# Patient Record
Sex: FEMALE | Race: WHITE
[De-identification: names, ages, dates, MRNs, and addresses within clinical notes are randomized per-mention and may not be internally consistent; named-entity substitution may affect disease eponyms.]

---

## 2018-03-16 ENCOUNTER — HOSPITAL ENCOUNTER (EMERGENCY)
Dept: HOSPITAL 68 - ERH | Age: 28
End: 2018-03-16
Payer: COMMERCIAL

## 2018-03-16 VITALS — WEIGHT: 175 LBS | HEIGHT: 65 IN | BODY MASS INDEX: 29.16 KG/M2

## 2018-03-16 VITALS — SYSTOLIC BLOOD PRESSURE: 136 MMHG | DIASTOLIC BLOOD PRESSURE: 74 MMHG

## 2018-03-16 DIAGNOSIS — R10.84: Primary | ICD-10-CM

## 2018-03-16 LAB
ABSOLUTE GRANULOCYTE CT: 13.6 /CUMM (ref 1.4–6.5)
BASOPHILS # BLD: 0 /CUMM (ref 0–0.2)
BASOPHILS NFR BLD: 0.2 % (ref 0–2)
EOSINOPHIL # BLD: 0.1 /CUMM (ref 0–0.7)
EOSINOPHIL NFR BLD: 0.7 % (ref 0–5)
ERYTHROCYTE [DISTWIDTH] IN BLOOD BY AUTOMATED COUNT: 13.2 % (ref 11.5–14.5)
GRANULOCYTES NFR BLD: 88.4 % (ref 42.2–75.2)
HCT VFR BLD CALC: 41.1 % (ref 37–47)
LYMPHOCYTES # BLD: 1.2 /CUMM (ref 1.2–3.4)
MCH RBC QN AUTO: 30.6 PG (ref 27–31)
MCHC RBC AUTO-ENTMCNC: 34 G/DL (ref 33–37)
MCV RBC AUTO: 90 FL (ref 81–99)
MONOCYTES # BLD: 0.5 /CUMM (ref 0.1–0.6)
PLATELET # BLD: 303 /CUMM (ref 130–400)
PMV BLD AUTO: 8.7 FL (ref 7.4–10.4)
RED BLOOD CELL CT: 4.57 /CUMM (ref 4.2–5.4)
WBC # BLD AUTO: 15.3 /CUMM (ref 4.8–10.8)

## 2018-03-16 NOTE — ED GENERAL ADULT
History of Present Illness
 
General
Chief Complaint: Abdominal Pain/Flank Pain
Stated Complaint: +V, ABD PAIN X3DAYS
Source: patient
Exam Limitations: no limitations
 
Vital Signs & Intake/Output
Vital Signs & Intake/Output
 ED Intake and Output
 
 
 03/17 0000 03/16 1200
 
Intake Total 1360 
 
Output Total  
 
Balance 1360 
 
   
 
Intake, IV 1000 
 
Intake, Oral 360 
 
Patient  175 lb
 
Weight  
 
Weight  Reported by Patient
 
Measurement  
 
Method  
 
 
 
Allergies
Coded Allergies:
No Known Allergies (03/16/18)
 
Reconcile Medications
Ondansetron (Zofran Odt) 4 MG TAB.RAPDIS   1 TAB SL TID PRN nausea
 
Triage Note:
29 Y/O FEMALE C/O DIFFUSE R SIDED ABDOMINAL PAIN X
APPROX 4-5 DAYS; +N/V/D LAST NIGHT AND THIS
MORNING.  PT STATES SHE HAD HER APPENDIX OUT FEW
YEARS AGO, PAIN IN SIMILIAR AREA WITH RADIATION
INTO RUQ.  PT DENIES FEVERS.  DENIES SICK
CONTACTS.  HAS IUD IN PLACE.
Triage Nurses Notes Reviewed? yes
Onset: Gradual
Duration: day(s):
Timing: constant
Pregnant: No
Patient currently breastfeeds: No
HPI:
28-year-old otherwise healthy female presenting with abdominal pain and NVD. Pt 
reports lower abdominal pain that radiates to her upper abdomen in "waves" x4-5 
days. Last night developed NVD. Reoprts 2-3 episodes of soft stools, not watery 
diarrhea. Reports constant nausea with 1 episode of non-bloody emesis last 
night. Denies fevers, dysuria, hematuria, urinary frequency/urgency, vaginal 
bleeding, vaginal discharge/odor. No prior h/o STD's, and pt has no concern for 
STD infeciton. Prior abdominal surgeries include appendectomy.
(Kamilla Miller)
 
Past History
 
Travel History
Traveled to [place] past 21 day No
 
Medical History
Any Pertinent Medical History? none
Neurological: NONE
EENT: NONE
Cardiovascular: NONE
Respiratory: NONE
Gastrointestinal: NONE
Hepatic: NONE
Renal: NONE
Musculoskeletal: NONE
Psychiatric: NONE
Endocrine: NONE
Blood Disorders: NONE
Cancer(s): NONE
GYN/Reproductive: NONE
 
Surgical History
Surgical History: appendectomy
 
Psychosocial History
What is your primary language English
Tobacco Use: Quit >30 days ago
 
Family History
Hx Contributory? No
(Kamilla Miller)
 
Review of Systems
 
Review of Systems
Constitutional:
Reports: no symptoms. 
EENTM:
Reports: no symptoms. 
Respiratory:
Reports: no symptoms. 
Cardiovascular:
Reports: no symptoms. 
GI:
Reports: see HPI, abdominal pain, diarrhea, nausea, vomiting. 
Genitourinary:
Reports: no symptoms. 
Musculoskeletal:
Reports: no symptoms. 
Skin:
Reports: no symptoms. 
Neurological/Psychological:
Reports: no symptoms. 
Hematologic/Endocrine:
Reports: no symptoms. 
Immunologic/Allergic:
Reports: no symptoms. 
(Kamilla Miller)
 
Physical Exam
 
Physical Exam
General Appearance: well developed/nourished, no apparent distress, alert, awake
, comfortable
Head: atraumatic, normal appearance
Eyes:
Bilateral: normal appearance. 
Neck: normal inspection
Respiratory: normal breath sounds, lungs clear
Cardiovascular: regular rate/rhythm, normal peripheral pulses
Gastrointestinal: soft, non-tender, hyperactive bowel sounds
Back: normal inspection
Extremities: normal inspection
Neurologic/Psych: awake, alert, oriented x 3, normal gait
Skin: intact, normal color, warm/dry
 
Core Measures
ACS in differential dx? No
CVA/TIA Diagnosis: No
Sepsis Present: No
Sepsis Focused Exam Completed? No
(Kamilla Miller)
 
Progress
Differential Diagnoses
I considered the following diagnoses in my evaluation of the patient: [
Gastroenteritis vs ovarian torsion vs ovarian cyst vs UTI vs cervicitis/PID]
 
Plan of Care:
 Orders
 
 
Procedure Date/time Status
 
URINE PREGNANCY 03/16 1214 Complete
 
URINALYSIS 03/16 1214 Complete
 
LIPASE 03/16 1214 Complete
 
LACTIC ACID 03/16 1214 Complete
 
COMPREHENSIVE METABOLIC PANEL 03/16 1214 Complete
 
CBC WITHOUT DIFFERENTIAL 03/16 1214 Complete
 
 
 Laboratory Tests
 
 
 
03/16/18 1514:
Lactic Acid Cancelled
 
03/16/18 1245:
Anion Gap 13, Estimated GFR > 60, BUN/Creatinine Ratio 22.0, Glucose 105  H, 
Lactic Acid 1.6, Calcium 9.2, Total Bilirubin 1.1, AST 16, ALT 31, Alkaline 
Phosphatase 59, Total Protein 7.1, Albumin 4.4, Globulin 2.7, Albumin/Globulin 
Ratio 1.6, Lipase 51, CBC w Diff NO MAN DIFF REQ, RBC 4.57, MCV 90.0, MCH 30.6, 
MCHC 34.0, RDW 13.2, MPV 8.7, Gran % 88.4  H, Lymphocytes % 7.6  L, Monocytes % 
3.1, Eosinophils % 0.7, Basophils % 0.2, Absolute Granulocytes 13.6  H, Absolute
Lymphocytes 1.2, Absolute Monocytes 0.5, Absolute Eosinophils 0.1, Absolute 
Basophils 0
 
03/16/18 1234:
Urine Color YEL, Urine Clarity CLEAR, Urine pH 6.5, Ur Specific Gravity 1.020, 
Urine Protein TRACE  H, Urine Ketones NEG, Urine Nitrite NEG, Urine Bilirubin 
NEG, Urine Urobilinogen 1.0, Ur Leukocyte Esterase NEG, Ur Microscopic SEDIMENT 
EXAMINED, Urine WBC 1-3  H, Ur Epithelial Cells MOD  H, Urine Bacteria MOD  H, 
Urine Hemoglobin NEG, Urine Glucose NEG, Urine Pregnancy Test NEGATIVE
 
Labs remarkable for WBC 15. Labs otherwise unremarkable. Urine not concerning 
for infection. 
 
TVUS
IMPRESSION:
IUD in appropriate position; otherwise, unremarkable pelvic ultrasound.
 
Pt reports improvement in abdominal pain after toradol and IVF. Suspect likely 
with gastroenteritis, although pelvic/cervical infections have not been ruled 
out as pt is declining pelvic exam. Counseled on supportive care and strict 
return precautions. Given rx zofran. Will f/u with PMD for re-evaluation.
Initial ED EKG: none
(Kamilla Miller)
 
Departure
 
Departure
Disposition: HOME OR SELF CARE
Condition: Stable
Clinical Impression
Primary Impression: Abdominal pain
Referrals:
Kristina Jacob DO (PCP/Family)
 
Additional Instructions:
Use Zofran as needed for nausea and vomiting.  Maintain adequate fluid intake in
small meals as tolerable.  Follow-up with your primary care provider for 
reevaluation.  Return to the emergency department for any new or worsening 
symptoms.
Departure Forms:
Customer Survey
General Discharge Information
Prescriptions:
Current Visit Scripts
Ondansetron (Zofran Odt) 1 TAB SL TID PRN nausea 
     #20 TAB 
 
 
(Kamilla Miller)
 
PA/NP Co-Sign Statement
Statement:
ED Attending supervision documentation-
 
[] I saw and evaluated the patient. I have also reviewed all the pertinent lab 
results and diagnostic results. I agree with the findings and the plan of care 
as documented in the PA's/NP's documentation. 
 
[x] I have reviewed the ED Record and agree with the PA's/NP's documentation.
 
[] Additions or exceptions (if any) to the PAs/NP's note and plan are 
summarized below:
[]
 
(Andres Cantor DO)
 
Critical Care Note
 
Critical Care Note
Critical Care Time: non-applicable
(Kamilla Miller)

## 2018-03-16 NOTE — ULTRASOUND REPORT
EXAMINATIONS:
ULTRASOUND PELVIC, COMPLETE AND DOPPLER INTERROGATION
 
CLINICAL INFORMATION:
Lower abdominal pain. Nausea, vomiting.
 
COMPARISON:
None.
 
TECHNIQUE:
Transabdominal and transvaginal imaging was performed.  Transvaginal imaging
was performed for further evaluation of the endometrium and adnexa. Doppler
interrogation spectral analysis was performed.
 
FINDINGS:
The uterus is of normal size and echogenicity measuring 6.9 x 3.6 x 5.2 cm.
An IUD is in appropriate position. A regular homogeneous endometrium is
identified measuring 0.2 cm. The cervical length is 1.5 cm.
 
Both ovaries are of normal size and echogenicity. The right measures 3.1 x
2.1 x 2.8 cm for a volume of 9.5 cc. The left measures 2.8 x 1.7 x 1.9 cm for
a volume of 4.7 cc. Normal arterial and venous blood flow is present
bilaterally.
 
There is no pelvic free fluid.
 
IMPRESSION:
 
IUD in appropriate position; otherwise, unremarkable pelvic ultrasound.